# Patient Record
Sex: MALE | Race: WHITE
[De-identification: names, ages, dates, MRNs, and addresses within clinical notes are randomized per-mention and may not be internally consistent; named-entity substitution may affect disease eponyms.]

---

## 2018-05-22 ENCOUNTER — HOSPITAL ENCOUNTER (OUTPATIENT)
Dept: HOSPITAL 62 - SP | Age: 58
End: 2018-05-22
Attending: INTERNAL MEDICINE
Payer: COMMERCIAL

## 2018-05-22 DIAGNOSIS — R42: Primary | ICD-10-CM

## 2018-05-22 PROCEDURE — 93880 EXTRACRANIAL BILAT STUDY: CPT

## 2018-05-22 NOTE — RADIOLOGY REPORT (SQ)
EXAM DESCRIPTION:  CAROTID DOPPLER



COMPLETED DATE/TIME:  5/22/2018 8:45 am



REASON FOR STUDY:  DIZZINESS R42  DIZZINESS AND GIDDINESS



COMPARISON:  None.



TECHNIQUE:  Grayscale ultrasound, Doppler velocity and spectra, and color Doppler images acquired of 
the extra-cranial carotid and vertebral arteries. Images stored on PACS.



LIMITATIONS:  None.



FINDINGS:  RIGHT CAROTID

CCA Velocities: Within normal limits.

ICA Velocities

 Peak systolic 0.97 m/s.

 End diastolic 0.36 m/s.

Proximal ICA/CCA peak systolic ratio 1.42.

Spectra normal. No significant plaque.

LEFT CAROTID

CCA Velocities: Within normal limits.

ICA Velocities

 Peak systolic 0.73 m/s.

 End diastolic 0.36 m/s.

Proximal ICA/CCA peak systolic ratio 1.04.

Spectra normal. No significant plaque.

VERTEBRAL ARTERIES: Antegrade flow.  Normal waveforms.

SUBCLAVIAN ARTERIES: No finding.

OTHER: No other significant finding.



IMPRESSION:  NO HEMODYNAMICALLY SIGNIFICANT STENOSIS.



COMMENT:  Quality ID #195:  Velocity criteria are extrapolated from the diameter data as defined by t
he Society of Radiologists in Ultrasound Consensus Conference. Radiology 2003: 229; 340-346.



TECHNICAL DOCUMENTATION:  JOB ID:  [id number]

 2011 [employer]- All Rights Reserved



Reading location - IP/workstation name: [email or web address]

## 2019-09-03 ENCOUNTER — HOSPITAL ENCOUNTER (OUTPATIENT)
Dept: HOSPITAL 62 - SP | Age: 59
End: 2019-09-03
Attending: INTERNAL MEDICINE
Payer: COMMERCIAL

## 2019-09-03 DIAGNOSIS — I80.11: Primary | ICD-10-CM

## 2019-09-03 PROCEDURE — 93970 EXTREMITY STUDY: CPT

## 2019-09-03 NOTE — RADIOLOGY REPORT (SQ)
EXAM DESCRIPTION:  VENOUS BILATERAL LOWER



COMPLETED DATE/TIME:  9/3/2019 11:42 am



REASON FOR STUDY:  SWELLING I80.9  PHLEBITIS AND THROMBOPHLEBITIS OF UNSPECIFIED SITE



COMPARISON:  None.



TECHNIQUE:  Dynamic and static gray scale and color images acquired of both lower extremity venous sy
stems. Selected spectral images acquired with additional compression and augmentation maneuvers. Imag
es stored on PACS.



LIMITATIONS:  None.



FINDINGS:  RIGHT LEG

COMMON FEMORAL AND FEMORAL: Normal phasicity, compression and augmentation. No visualized echogenic m
aterial on gray scale. No defects on color images.

POPLITEAL: Normal compression and augmentation. No visualized echogenic material on gray scale. No de
fects on color images.

CALF VESSELS: Normal compression and augmentation. No visualized echogenic material on gray scale. No
 defects on color image.

GSV AND SSV: Normal compression. No visualized echogenic material on gray scale. No defects on color 
images.

ANY DEEP VENOUS INSUFFICIENCY: Not evaluated.

ANY EVIDENCE OF POPLITEAL CYST: No.

OTHER: No other significant finding.

LEFT LEG

COMMON FEMORAL AND FEMORAL: A small hypoechoic incompletely occlusive clot is present in the left gre
ater saphenous vein in the thigh.  This is adjacent to a a thrombosed superficial thigh varicosity wi
th hypoechoic acute clot.

POPLITEAL: Normal compression and augmentation. No visualized echogenic material on gray scale. No de
fects on color images.

CALF VESSELS: Normal compression and augmentation. No visualized echogenic material on gray scale. No
 defects on color images.

GSV AND SSV: Normal compression. No visualized echogenic material on gray scale. No defects on color 
images.

ANY DEEP VENOUS INSUFFICIENCY: Not evaluated.

ANY EVIDENCE POPLITEAL CYST: No.

OTHER: In the area of tender palpable abnormality left thigh, a thrombosed superficial varicosity abilio
led with hypoechoic clot is present.



IMPRESSION:  NO EVIDENCE DVT OR SVT IN THE RIGHT LEG.

ACUTE SUPERFICIAL VENOUS THROMBOSIS, LEFT THIGH VARICOSITY.

ACUTE SMALL HYPOECHOIC INCOMPLETELY OCCLUSIVE CLOT LEFT GREATER SAPHENOUS VEIN, A ADJACENT TO THE THR
OMBOSED SUPERFICIAL VARICOSITY.



COMMENT:   Pertinent findings on the imaging study reported as a CRITICAL RESULT to LIO VARELA MD at11:54 on 9/3/2019.

Category of Critical Result: LEFT LEG SUPERFICIAL AND DEEP VENOUS THROMBOSIS



TECHNICAL DOCUMENTATION:  JOB ID:  [id number]

 2011 [employer]- All Rights Reserved



Reading location - IP[facility]station [email or web address]

## 2019-10-05 ENCOUNTER — HOSPITAL ENCOUNTER (EMERGENCY)
Dept: HOSPITAL 62 - ER | Age: 59
Discharge: HOME | End: 2019-10-05
Payer: COMMERCIAL

## 2019-10-05 VITALS — SYSTOLIC BLOOD PRESSURE: 110 MMHG | DIASTOLIC BLOOD PRESSURE: 80 MMHG

## 2019-10-05 DIAGNOSIS — I10: ICD-10-CM

## 2019-10-05 DIAGNOSIS — R07.9: Primary | ICD-10-CM

## 2019-10-05 DIAGNOSIS — D72.829: ICD-10-CM

## 2019-10-05 DIAGNOSIS — Z88.0: ICD-10-CM

## 2019-10-05 LAB
ABSOLUTE LYMPHOCYTES# (MANUAL): 2.5 10^3/UL (ref 0.5–4.7)
ABSOLUTE MONOCYTES # (MANUAL): 0.8 10^3/UL (ref 0.1–1.4)
ADD MANUAL DIFF: YES
ALBUMIN SERPL-MCNC: 4.3 G/DL (ref 3.5–5)
ALP SERPL-CCNC: 44 U/L (ref 38–126)
ANION GAP SERPL CALC-SCNC: 8 MMOL/L (ref 5–19)
APTT BLD: 31 SEC (ref 23.5–35.8)
AST SERPL-CCNC: 27 U/L (ref 17–59)
BASOPHILS NFR BLD MANUAL: 1 % (ref 0–2)
BILIRUB DIRECT SERPL-MCNC: 0.2 MG/DL (ref 0–0.4)
BILIRUB SERPL-MCNC: 0.5 MG/DL (ref 0.2–1.3)
BUN SERPL-MCNC: 27 MG/DL (ref 7–20)
CALCIUM: 10.3 MG/DL (ref 8.4–10.2)
CHLORIDE SERPL-SCNC: 103 MMOL/L (ref 98–107)
CK MB SERPL-MCNC: 0.83 NG/ML (ref ?–4.55)
CK SERPL-CCNC: 101 U/L (ref 55–170)
CO2 SERPL-SCNC: 27 MMOL/L (ref 22–30)
EOSINOPHIL NFR BLD MANUAL: 23 % (ref 0–6)
ERYTHROCYTE [DISTWIDTH] IN BLOOD BY AUTOMATED COUNT: 13.9 % (ref 11.5–14)
GLUCOSE SERPL-MCNC: 109 MG/DL (ref 75–110)
HCT VFR BLD CALC: 41 % (ref 37.9–51)
HGB BLD-MCNC: 13.5 G/DL (ref 13.5–17)
INR PPP: 1.17
MCH RBC QN AUTO: 27.7 PG (ref 27–33.4)
MCHC RBC AUTO-ENTMCNC: 33.1 G/DL (ref 32–36)
MCV RBC AUTO: 84 FL (ref 80–97)
MONOCYTES % (MANUAL): 5 % (ref 3–13)
NT PRO BNP: 51 PG/ML (ref 5–900)
PLATELET # BLD: 338 10^3/UL (ref 150–450)
PLATELET COMMENT: ADEQUATE
PLATELET GIANT: PRESENT
PLATELET LARGE: PRESENT
POTASSIUM SERPL-SCNC: 5.1 MMOL/L (ref 3.6–5)
PROT SERPL-MCNC: 7.3 G/DL (ref 6.3–8.2)
PROTHROMBIN TIME: 15 SEC (ref 11.4–15.4)
RBC # BLD AUTO: 4.89 10^6/UL (ref 4.35–5.55)
RBC MORPH BLD: (no result)
SEGMENTED NEUTROPHILS % (MAN): 55 % (ref 42–78)
TOTAL CELLS COUNTED BLD: 100
TROPONIN I SERPL-MCNC: < 0.012 NG/ML
VARIANT LYMPHS NFR BLD MANUAL: 16 % (ref 13–45)
WBC # BLD AUTO: 15.5 10^3/UL (ref 4–10.5)

## 2019-10-05 PROCEDURE — 84443 ASSAY THYROID STIM HORMONE: CPT

## 2019-10-05 PROCEDURE — 93010 ELECTROCARDIOGRAM REPORT: CPT

## 2019-10-05 PROCEDURE — 36415 COLL VENOUS BLD VENIPUNCTURE: CPT

## 2019-10-05 PROCEDURE — 93005 ELECTROCARDIOGRAM TRACING: CPT

## 2019-10-05 PROCEDURE — 83880 ASSAY OF NATRIURETIC PEPTIDE: CPT

## 2019-10-05 PROCEDURE — 83735 ASSAY OF MAGNESIUM: CPT

## 2019-10-05 PROCEDURE — 82550 ASSAY OF CK (CPK): CPT

## 2019-10-05 PROCEDURE — 84484 ASSAY OF TROPONIN QUANT: CPT

## 2019-10-05 PROCEDURE — 71046 X-RAY EXAM CHEST 2 VIEWS: CPT

## 2019-10-05 PROCEDURE — 99285 EMERGENCY DEPT VISIT HI MDM: CPT

## 2019-10-05 PROCEDURE — 82553 CREATINE MB FRACTION: CPT

## 2019-10-05 PROCEDURE — 85730 THROMBOPLASTIN TIME PARTIAL: CPT

## 2019-10-05 PROCEDURE — 80053 COMPREHEN METABOLIC PANEL: CPT

## 2019-10-05 PROCEDURE — 85025 COMPLETE CBC W/AUTO DIFF WBC: CPT

## 2019-10-05 PROCEDURE — 85610 PROTHROMBIN TIME: CPT

## 2019-10-05 NOTE — ER DOCUMENT REPORT
ED General





- General


Chief Complaint: Chest Pain


Stated Complaint: CHEST PAIN, SHORT OF BREATH


Time Seen by Provider: 10/05/19 12:55


Primary Care Provider: 


LIO VARELA MD [Primary Care Provider] - Follow up as needed


Mode of Arrival: Ambulatory


Notes: 


59-year-old male with hypertension and hyperlipidemia presents to the ED for 

complaint of chest pain.  He states he saw after Jose yesterday for an 

infection and was prescribed Zithromax but started developing the chest pain 

last night.  Dr. Varela had told him that if he develops pain or worsening 

symptoms to come to the emergency room.  Denies any diaphoresis, nausea, acute 

shortness of breath, patient is not a smoker, no positive family history for 

cardiac disease or adverse cardiac events.  Patient states the pain is rep

roducible but is still present at rest.


TRAVEL OUTSIDE OF THE [place] IN LAST 30 DAYS: No





- Related Data


Allergies/Adverse Reactions: 


                                        





Penicillins Allergy (Unknown, Verified 09/22/16 10:11)


   











Past Medical History





- General


Information source: Patient





- Social History


Smoking Status: Unknown if Ever Smoked


Family History: None


Patient has suicidal ideation: No


Patient has homicidal ideation: No





- Past Medical History


Cardiac Medical History: Reports: Hx Hypercholesterolemia, Hx Hypertension


   Denies: Hx Coronary Artery Disease, Hx Heart Attack


Pulmonary Medical History: 


   Denies: Hx Asthma, Hx Bronchitis, Hx COPD, Hx Pneumonia, Hx Tuberculosis


Neurological Medical History: Reports: Hx Seizures - AS A CHILD X1.  Denies: Hx 

Cerebrovascular Accident


GI Medical History: Denies: Hx Hepatitis, Hx Hiatal Hernia, Hx Ulcer


Musculoskeletal Medical History: Denies Hx Arthritis


Infectious Medical History: Denies: Hx Hepatitis


Past Surgical History: Denies: Hx Open Heart Surgery, Hx Pacemaker





- Immunizations


Hx Diphtheria, Pertussis, Tetanus Vaccination: No





Review of Systems





- Review of Systems


Constitutional: See HPI


EENT: No symptoms reported


Cardiovascular: See HPI


Respiratory: See HPI


Gastrointestinal: See HPI


Genitourinary: No symptoms reported


Male Genitourinary: No symptoms reported


Musculoskeletal: No symptoms reported


Skin: No symptoms reported


Hematologic/Lymphatic: No symptoms reported


Neurological/Psychological: No symptoms reported





Physical Exam





- Vital signs


Vitals: 





                                        











Temp Pulse Resp BP Pulse Ox


 


 98.8 F   78   18   109/68   95 


 


 10/05/19 12:51  10/05/19 12:51  10/05/19 12:51  10/05/19 12:51  10/05/19 12:51














- Notes


Notes: 





PHYSICAL EXAMINATION:





Reviewed vital signs and charting by RN





GENERAL: Alert, interacts well. No acute distress.


HEAD: Normocephalic, atraumatic.


EYES: Pupils equal and round. Extraocular movements intact.


ENT: Oral mucosa moist, tongue midline. 


NECK: Full range of motion. Trachea midline.


LUNGS: Clear to auscultation bilaterally, no wheezes, rales, or rhonchi. No 

respiratory distress.


HEART: Regular rate and rhythm. No murmur


ABDOMEN: soft, non-tender.  No distention. Bowel sounds present


EXTREMITIES: Moves all 4 extremities spontaneously. No edema,  No cyanosis.


PSYCH: Normal affect, normal mood.


SKIN: Warm, dry, normal turgor. No rashes or lesions noted.








Course





- Re-evaluation


Re-evalutation: 





10/05/19 15:34


Overall well-appearing. Presentation of chest pain in an otherwise well 

appearing patient. Low clinical suspicion for ACS given clinical history, exam, 

EKG without ST elevations or depressions, and negative initial troponin. PE also

seems unlikely given clinical history, absence of tachycardia or dyspnea. 

Patient is PERC criteria negative. CXR without evidence of pneumothorax or 

pneumonia. No widened mediastinum. Aortic dissection also seems unlikely given 

history, symmetric pulses, CXR, and vitals. Chest x-ray did not show pulmonary 

infiltrate or any evidence of a pneumonia.  EKG normal sinus rhythm with a rate 

of 80 patient with a leukocytosis of 15,500 I spoke with Dr. Varela he said it

is actually come down from over 18,000 that was in his office.  Dr. Moreira said

that he put him on a short course of Levaquin for pneumonia but the patient 

returned to his office feeling worse and he put him on Zithromax.  Initial 

troponin negative.  Heart score 2 for age and risk factors.  Mook moreira 

states that he will see the patient in the office Monday morning and based on 

patient's appearance, vital signs, and work-up patient does not be admitted to 

the hospital.  I explained this to patient and he agrees with the plan.  He is 

stable for discharge.


10/05/19 15:37








- Vital Signs


Vital signs: 





                                        











Temp Pulse Resp BP Pulse Ox


 


 98.8 F   78   18   109/68   95 


 


 10/05/19 12:51  10/05/19 12:51  10/05/19 12:51  10/05/19 12:51  10/05/19 12:51














- Laboratory


Result Diagrams: 


                                 10/05/19 13:09





                                 10/05/19 13:09


Laboratory results interpreted by me: 





                                        











  10/05/19 10/05/19





  13:09 13:09


 


WBC  15.5 H 


 


Eosinophils % (Manual)  23 H 


 


Abs Neuts (Manual)  8.5 H 


 


Absolute Eos (Manual)  3.6 H 


 


Potassium   5.1 H


 


BUN   27 H


 


Creatinine   1.28 H


 


Est GFR (MDRD) Non-Af   58 L


 


Calcium   10.3 H














Discharge





- Discharge


Clinical Impression: 


Chest pain


Qualifiers:


 Chest pain type: unspecified Qualified Code(s): R07.9 - Chest pain, unspecified





Condition: Good


Disposition: HOME, SELF-CARE


Additional Instructions: 


You were seen today for chest pain.  The exact cause of your pain is unclear. 

However, based on your cardiac enzyme testing, chest x-ray, and EKG it does not 

appear that it is from an immediately life-threatening cause at this time.  

Although your testing here is normal is critical that you follow-up with your 

primary care physician for continued evaluation of this chest pain and possible 

stress testing.  Dr. Varela wants to see you in his office early next week and

you can discuss the potential of a reactive stress test.  Please return to 

emergency department immediately if you have worsening of your chest pain, 

shortness of breath, vomiting, become unable to exert yourself due to pain or 

difficulty breathing, you pass out, or have any pain that radiates into your 

arms, jaw, or back. Please also return if you have any additional symptoms that 

are concerning to you.


Referrals: 


LIO VARELA MD [Primary Care Provider] - 10/07/19 8:00 am

## 2019-10-05 NOTE — ER DOCUMENT REPORT
ED Medical Screen (RME)





- General


Stated Complaint: CHEST PAIN, SHORT OF BREATH


Time Seen by Provider: 10/05/19 12:55


Primary Care Provider: 


LIO VARELA MD [Primary Care Provider] - Follow up as needed


Mode of Arrival: Ambulatory


Information source: Patient


Notes: 





59-year-old male presented to ED for complaint of chest pain.  He states he saw 

Jose yesterday for an infection in his long-standing started on antibiotics 

but last night he developed chest pain Dr. Vraela had told him that if he 

develops pain or worsening symptoms to come to the emergency room so he is at 

the emergency room.  He is alert oriented respirations regular and unlabored 

speaking in full sentences.  He did have a EKG here in 2012.  He states he does 

take blood pressure and cholesterol medications.  Denies any heart attacks 

himself his brothers or his father.

















I have greeted and performed a rapid initial assessment of this patient.  A 

comprehensive ED assessment and evaluation of the patient, analysis of test 

results and completion of medical decision making process will be conducted by 

an additional ED providers.


TRAVEL OUTSIDE OF THE [place] IN LAST 30 DAYS: No





- Related Data


Allergies/Adverse Reactions: 


                                        





Penicillins Allergy (Unknown, Verified 09/22/16 10:11)


   











Past Medical History





- Past Medical History


Cardiac Medical History: Reports: Hx Hypercholesterolemia, Hx Hypertension


   Denies: Hx Coronary Artery Disease, Hx Heart Attack


Pulmonary Medical History: 


   Denies: Hx Asthma, Hx Bronchitis, Hx COPD, Hx Pneumonia, Hx Tuberculosis


Neurological Medical History: Reports: Hx Seizures - AS A CHILD X1.  Denies: Hx 

Cerebrovascular Accident


GI Medical History: Denies: Hx Hepatitis, Hx Hiatal Hernia, Hx Ulcer


Musculoskeltal Medical History: Denies Hx Arthritis


Infectious Medical History: Denies: Hx Hepatitis


Past Surgical History: Denies: Hx Open Heart Surgery, Hx Pacemaker





- Immunizations


Hx Diphtheria, Pertussis, Tetanus Vaccination: No





Physical Exam





- Vital signs


Vitals: 





                                        











Temp Pulse Resp BP Pulse Ox


 


 98.8 F   78   18   109/68   95 


 


 10/05/19 12:51  10/05/19 12:51  10/05/19 12:51  10/05/19 12:51  10/05/19 12:51














Course





- Vital Signs


Vital signs: 





                                        











Temp Pulse Resp BP Pulse Ox


 


 98.8 F   78   18   109/68   95 


 


 10/05/19 12:51  10/05/19 12:51  10/05/19 12:51  10/05/19 12:51  10/05/19 12:51














Doctor's Discharge





- Discharge


Referrals: 


LIO VARELA MD [Primary Care Provider] - Follow up as needed

## 2019-10-05 NOTE — RADIOLOGY REPORT (SQ)
EXAM DESCRIPTION:  CHEST 2 VIEWS



COMPLETED DATE/TIME:  10/5/2019 1:28 pm



REASON FOR STUDY:  chest pain



COMPARISON:  4/24/2012.



EXAM PARAMETERS:  NUMBER OF VIEWS: two views

TECHNIQUE: Digital Frontal and Lateral radiographic views of the chest acquired.

RADIATION DOSE: NA

LIMITATIONS: none



FINDINGS:  LUNGS AND PLEURA: No opacities, masses or pneumothorax. No pleural effusion.

MEDIASTINUM AND HILAR STRUCTURES: No masses or contour abnormalities.

HEART AND VASCULAR STRUCTURES: Heart normal size.  No evidence for failure.

BONES: No acute findings.

HARDWARE: None in the chest.

OTHER: No other significant finding.



IMPRESSION:  NO ACUTE RADIOGRAPHIC FINDING IN THE CHEST.



TECHNICAL DOCUMENTATION:  JOB ID:  [id number]

 2011 [employer]- All Rights Reserved



Reading location - IP/workstation name: [email or web address]

## 2020-08-25 ENCOUNTER — HOSPITAL ENCOUNTER (OUTPATIENT)
Dept: HOSPITAL 62 - SP | Age: 60
End: 2020-08-25
Attending: FAMILY MEDICINE
Payer: COMMERCIAL

## 2020-08-25 DIAGNOSIS — R42: Primary | ICD-10-CM

## 2020-08-25 PROCEDURE — 93880 EXTRACRANIAL BILAT STUDY: CPT

## 2020-08-25 NOTE — RADIOLOGY REPORT (SQ)
EXAM DESCRIPTION:  CAROTID DOPPLER



IMAGES COMPLETED DATE/TIME:  8/25/2020 10:21 am



REASON FOR STUDY:  DIZZINESS R42  DIZZINESS AND GIDDINESS



COMPARISON:  5/22/2018



TECHNIQUE:  Grayscale ultrasound, Doppler velocity and spectra, and color Doppler images acquired of 
the extra-cranial carotid and vertebral arteries. Images stored on PACS.



LIMITATIONS:  None.



FINDINGS:  RIGHT CAROTID

CCA Velocities: Within normal limits.

ICA Velocities

Peak systolic 100 cm/s.

End diastolic 35 cm/s.

Proximal ICA/CCA peak systolic ratio 1.47.

There is a small amount of plaque in the carotid bulb.

LEFT CAROTID

CCA Velocities: Within normal limits.

ICA Velocities

Peak systolic 59 cm/s.

End diastolic 28 cm/s.

Proximal ICA/CCA peak systolic ratio 0.82.

There is some plaque in the carotid bulb.

VERTEBRAL ARTERIES: Antegrade flow.  Normal waveforms.

SUBCLAVIAN ARTERIES: No finding.

OTHER: No other significant finding.



IMPRESSION:  NO HEMODYNAMICALLY SIGNIFICANT STENOSIS.



COMMENT:  Quality ID #195:  Velocity criteria are extrapolated from the diameter data as defined by t
he Society of Radiologists in Ultrasound Consensus Conference. Radiology 2003: 229; 340-346.



TECHNICAL DOCUMENTATION:  JOB ID:  [id number]

 2011 [employer]- All Rights Reserved



Reading location - IP/workstation name: [email or web address]